# Patient Record
Sex: FEMALE | Race: WHITE | NOT HISPANIC OR LATINO | Employment: STUDENT | URBAN - NONMETROPOLITAN AREA
[De-identification: names, ages, dates, MRNs, and addresses within clinical notes are randomized per-mention and may not be internally consistent; named-entity substitution may affect disease eponyms.]

---

## 2018-06-19 ENCOUNTER — HOSPITAL ENCOUNTER (EMERGENCY)
Facility: HOSPITAL | Age: 23
Discharge: HOME | End: 2018-06-19
Admitting: EMERGENCY MEDICINE

## 2018-06-19 ENCOUNTER — APPOINTMENT (OUTPATIENT)
Dept: GENERAL RADIOLOGY | Facility: HOSPITAL | Age: 23
End: 2018-06-19

## 2018-06-19 VITALS
WEIGHT: 184 LBS | RESPIRATION RATE: 15 BRPM | HEIGHT: 68 IN | BODY MASS INDEX: 27.89 KG/M2 | HEART RATE: 68 BPM | DIASTOLIC BLOOD PRESSURE: 76 MMHG | SYSTOLIC BLOOD PRESSURE: 128 MMHG | TEMPERATURE: 98 F | OXYGEN SATURATION: 100 %
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
## 2018-06-19 PROCEDURE — 99283 EMERGENCY DEPT VISIT LOW MDM: CPT

## 2018-06-19 PROCEDURE — 73630 X-RAY EXAM OF FOOT: CPT

## 2018-06-19 RX ORDER — AMOXICILLIN AND CLAVULANATE POTASSIUM 875; 125 MG/1; MG/1
1 TABLET, FILM COATED ORAL 2 TIMES DAILY
Qty: 14 TABLET | Refills: 0 | Status: SHIPPED | OUTPATIENT
Start: 2018-06-19

## 2018-06-19 RX ORDER — HYDROCODONE BITARTRATE AND ACETAMINOPHEN 5; 325 MG/1; MG/1
1 TABLET ORAL ONCE
Status: COMPLETED | OUTPATIENT
Start: 2018-06-19 | End: 2018-06-19

## 2018-06-19 RX ORDER — DOXYCYCLINE 100 MG/1
100 CAPSULE ORAL ONCE
Qty: 1 CAPSULE | Refills: 0 | Status: SHIPPED | OUTPATIENT
Start: 2018-06-19 | End: 2018-06-19

## 2018-06-19 RX ADMIN — HYDROCODONE BITARTRATE AND ACETAMINOPHEN 1 TABLET: 5; 325 TABLET ORAL at 14:52

## 2018-06-19 NOTE — ED PROVIDER NOTES
"Subjective     Lower Extremity Issue     Patient is a pleasant 22-year-old  female chief complaint of dog bite to the right foot.  Patient describes that she was at work.  Her employer's dog was present and was trying to get something.  She pushed the dog away with her right foot.  She was wearing socks and suede shoes.  The dog bit into her foot.  She had she to shake her foot inorder for the dog to release.  She denies multiple bites.  The bite did penetrate through her shoe and sock.  Patient does complain of some discomfort.  She is up to the tetanus vaccination.  She denies being pregnant.  She denies any fever.  She denies being diabetic.  Of note, patient reports a history of a tick bite to the distal medial tib-fib with a small red spot.    Review of Systems   Constitutional: Positive for activity change. Negative for appetite change.   HENT: Negative.    Respiratory: Negative.    Cardiovascular: Negative.    Gastrointestinal: Negative.    Musculoskeletal: Positive for gait problem and joint swelling.   Skin: Negative.    Psychiatric/Behavioral: Negative.    All other systems reviewed and are negative.      No past medical history on file.    No Known Allergies    No past surgical history on file.    No family history on file.    Social History     Social History   • Marital status: Single     Social History Main Topics   • Drug use: Unknown     Other Topics Concern   • Not on file       Prior to Admission medications    Not on File       Medications   HYDROcodone-acetaminophen (NORCO) 5-325 MG per tablet 1 tablet (1 tablet Oral Given 6/19/18 1452)       /82 (BP Location: Right arm, Patient Position: Lying)   Pulse 62   Temp 98.4 °F (36.9 °C) (Oral)   Resp 16   Ht 172.7 cm (68\")   Wt 83.5 kg (184 lb)   SpO2 100%   BMI 27.98 kg/m²       Objective   Physical Exam   Constitutional: She is oriented to person, place, and time. She appears well-developed and well-nourished.  Non-toxic " appearance. No distress.   HENT:   Head: Normocephalic and atraumatic.   Right Ear: External ear normal.   Left Ear: External ear normal.   Nose: Nose normal.   Mouth/Throat: Uvula is midline, oropharynx is clear and moist and mucous membranes are normal.   Eyes: Conjunctivae, EOM and lids are normal. Pupils are equal, round, and reactive to light. Lids are everted and swept, no foreign bodies found.   Neck: Trachea normal, normal range of motion, full passive range of motion without pain and phonation normal. Neck supple. Normal carotid pulses and no JVD present. Carotid bruit is not present. No neck rigidity.   Cardiovascular: Normal rate, regular rhythm, normal heart sounds, intact distal pulses and normal pulses.    Pulmonary/Chest: Effort normal and breath sounds normal. No stridor. No apnea and no tachypnea. No respiratory distress.   Abdominal: Normal appearance and normal aorta.   Musculoskeletal: Normal range of motion. She exhibits edema and tenderness.        Right ankle: Normal. Achilles tendon normal.        Right foot: There is tenderness, bony tenderness, swelling and laceration. There is normal capillary refill, no crepitus and no deformity.   Puncture wounds x 3 to the medial and dorsal aspect of right foot. No puncture wounds to sole.  Mild swelling. No erythema    Evidence of some type of bite with minimal circular erythema (1mm) around scab.     Neurological: She is alert and oriented to person, place, and time. She has normal strength. No cranial nerve deficit or sensory deficit. Gait normal. GCS eye subscore is 4. GCS verbal subscore is 5. GCS motor subscore is 6.   Skin: Skin is warm, dry and intact. Capillary refill takes less than 2 seconds. She is not diaphoretic. No pallor.   Psychiatric: She has a normal mood and affect. Her speech is normal and behavior is normal.   Nursing note and vitals reviewed.      Procedures         Lab Results (last 24 hours)     ** No results found for the last  24 hours. **          Xr Foot 3+ View Right    Result Date: 6/19/2018  Narrative: EXAMINATION: XR FOOT 3+ VW RIGHT- 6/19/2018 3:33 PM CDT  HISTORY: Dog bite.  REPORT: 3 views of the right foot were obtained. There are no comparison studies.  No fracture or dislocation is identified. The joint spaces are preserved. No radiopaque foreign body seen.      Impression: No acute abnormality. This report was finalized on 06/19/2018 15:34 by Dr. Jimmie Mccullough MD.      ED Course        Patient has been educated on wound care. Dog bite and tick bite information has been provided. S/sx for infection have been discussed. She is welcome to return to the ED for any acute issues.     MDM    Final diagnoses:   Tick bite, initial encounter   Dog bite, initial encounter          ULISES Zelaya  06/19/18 1541       ULISES Zelaya  06/19/18 1546

## 2018-06-19 NOTE — ED NOTES
Patient discharged with AVS. AVS reviewed and patient given RX x2. All questions answered at this time. Vitals stable at discharge.        Colby Roper RN  06/19/18 0539

## 2018-06-19 NOTE — DISCHARGE INSTRUCTIONS
Tick Bite Information, Adult  Ticks are insects that draw blood for food. Most ticks live in shrubs and grassy areas. They climb onto people and animals that brush against the leaves and grasses that they rest on. Then they bite, attaching themselves to the skin.  Most ticks are harmless, but some ticks carry germs that can spread to a person through a bite and cause a disease. To reduce your risk of getting a disease from a tick bite, it is important to take steps to prevent tick bites. It is also important to check for ticks after being outdoors. If you find that a tick has attached to you, watch for symptoms of disease.  How can I prevent tick bites?  Take these steps to help prevent tick bites when you are outdoors in an area where ticks are found:  · Use insect repellent that has DEET (20% or higher), picaridin, or PC5866 in it. Use it on:  ? Skin that is showing.  ? The top of your boots.  ? Your pant legs.  ? Your sleeve cuffs.  · For repellent products that contain permethrin, follow product instructions. Use these products on:  ? Clothing.  ? Gear.  ? Boots.  ? Tents.  · Wear protective clothing. Long sleeves and long pants offer the best protection from ticks.  · Wear light-colored clothing so you can see ticks more easily.  · Tuck your pant legs into your socks.  · If you go walking on a trail, stay in the middle of the trail so your skin, hair, and clothing do not touch the bushes.  · Avoid walking through areas with long grass.  · Check for ticks on your clothing, hair, and skin often while you are outside, and check again before you go inside. Make sure to check the places that ticks attach themselves most often. These places include the scalp, neck, armpits, waist, groin, and joint areas. Ticks that carry a disease called Lyme disease have to be attached to the skin for 24-48 hours. Checking for ticks every day will lessen your risk of this and other diseases.  · When you come indoors, wash your  clothes and take a shower or a bath right away. Dry your clothes in a dryer on high heat for at least 60 minutes. This will kill any ticks in your clothes.    What is the proper way to remove a tick?  If you find a tick on your body, remove it as soon as possible. Removing a tick sooner rather than later can prevent germs from passing from the tick to your body. To remove a tick that is crawling on your skin but has not bitten:  · Go outdoors and brush the tick off.  · Remove the tick with tape or a lint roller.    To remove a tick that is attached to your skin:  · Wash your hands.  · If you have latex gloves, put them on.  · Use tweezers, curved forceps, or a tick-removal tool to gently grasp the tick as close to your skin and the tick's head as possible.  · Gently pull with steady, upward pressure until the tick lets go. When removing the tick:  ? Take care to keep the tick's head attached to its body.  ? Do not twist or jerk the tick. This can make the tick's head or mouth break off.  ? Do not squeeze or crush the tick's body. This could force disease-carrying fluids from the tick into your body.    Do not try to remove a tick with heat, alcohol, petroleum jelly, or fingernail polish. Using these methods can cause the tick to salivate and regurgitate into your bloodstream, increasing your risk of getting a disease.  What should I do after removing a tick?  · Clean the bite area with soap and water, rubbing alcohol, or an iodine scrub.  · If an antiseptic cream or ointment is available, apply a small amount to the bite site.  · Wash and disinfect any instruments that you used to remove the tick.  How should I dispose of a tick?  To dispose of a live tick, use one of these methods:  · Place it in rubbing alcohol.  · Place it in a sealed bag or container.  · Wrap it tightly in tape.  · Flush it down the toilet.    Contact a health care provider if:  · You have symptoms of a disease after a tick bite. Symptoms of a  "tick-borne disease can occur from moments after the tick bites to up to 30 days after a tick is removed. Symptoms include:  ? Muscle, joint, or bone pain.  ? Difficulty walking or moving your legs.  ? Numbness in the legs.  ? Paralysis.  ? Red rash around the tick bite area that is shaped like a target or a \"bull's-eye.\"  ? Redness and swelling in the area of the tick bite.  ? Fever.  ? Repeated vomiting.  ? Diarrhea.  ? Weight loss.  ? Tender, swollen lymph glands.  ? Shortness of breath.  ? Cough.  ? Pain in the abdomen.  ? Headache.  ? Abnormal tiredness.  ? A change in your level of consciousness.  ? Confusion.  Get help right away if:  · You are not able to remove a tick.  · A part of a tick breaks off and gets stuck in your skin.  · Your symptoms get worse.  Summary  · Ticks may carry germs that can spread to a person through a bite and cause disease.  · Wear protective clothing and use insect repellent to prevent tick bites. Follow product instructions.  · If you find a tick on your body, remove it as soon as possible. If the tick is attached, do not try to remove with heat, alcohol, petroleum jelly, or fingernail polish.  · Remove the attached tick using tweezers, curved forceps, or a tick-removal tool. Gently pull with steady, upward pressure until the tick lets go. Do not twist or jerk the tick. Do not squeeze or crush the tick's body.  · If you have symptoms after being bitten by a tick, contact a health care provider.  This information is not intended to replace advice given to you by your health care provider. Make sure you discuss any questions you have with your health care provider.  Document Released: 12/15/2001 Document Revised: 09/29/2017 Document Reviewed: 09/29/2017  [employer] Interactive Patient Education © 2018 [employer]    "